# Patient Record
Sex: MALE | URBAN - METROPOLITAN AREA
[De-identification: names, ages, dates, MRNs, and addresses within clinical notes are randomized per-mention and may not be internally consistent; named-entity substitution may affect disease eponyms.]

---

## 2024-02-02 ENCOUNTER — APPOINTMENT (OUTPATIENT)
Dept: URBAN - METROPOLITAN AREA SURGERY 13 | Age: 49
Setting detail: DERMATOLOGY
End: 2024-02-08

## 2024-02-02 DIAGNOSIS — L81.4 OTHER MELANIN HYPERPIGMENTATION: ICD-10-CM

## 2024-02-02 DIAGNOSIS — D22 MELANOCYTIC NEVI: ICD-10-CM

## 2024-02-02 DIAGNOSIS — D485 NEOPLASM OF UNCERTAIN BEHAVIOR OF SKIN: ICD-10-CM

## 2024-02-02 DIAGNOSIS — L82.1 OTHER SEBORRHEIC KERATOSIS: ICD-10-CM

## 2024-02-02 DIAGNOSIS — Z85.828 PERSONAL HISTORY OF OTHER MALIGNANT NEOPLASM OF SKIN: ICD-10-CM

## 2024-02-02 DIAGNOSIS — D18.0 HEMANGIOMA: ICD-10-CM

## 2024-02-02 PROBLEM — D22.5 MELANOCYTIC NEVI OF TRUNK: Status: ACTIVE | Noted: 2024-02-02

## 2024-02-02 PROBLEM — D18.01 HEMANGIOMA OF SKIN AND SUBCUTANEOUS TISSUE: Status: ACTIVE | Noted: 2024-02-02

## 2024-02-02 PROBLEM — D48.5 NEOPLASM OF UNCERTAIN BEHAVIOR OF SKIN: Status: ACTIVE | Noted: 2024-02-02

## 2024-02-02 PROCEDURE — OTHER SUNSCREEN RECOMMENDATIONS: OTHER

## 2024-02-02 PROCEDURE — OTHER MIPS QUALITY: OTHER

## 2024-02-02 PROCEDURE — 99203 OFFICE O/P NEW LOW 30 MIN: CPT | Mod: 25

## 2024-02-02 PROCEDURE — OTHER COUNSELING: OTHER

## 2024-02-02 PROCEDURE — OTHER BIOPSY BY SHAVE METHOD: OTHER

## 2024-02-02 PROCEDURE — OTHER REASSURANCE: OTHER

## 2024-02-02 PROCEDURE — 11102 TANGNTL BX SKIN SINGLE LES: CPT

## 2024-02-02 ASSESSMENT — LOCATION SIMPLE DESCRIPTION DERM
LOCATION SIMPLE: RIGHT UPPER BACK
LOCATION SIMPLE: LEFT UPPER BACK

## 2024-02-02 ASSESSMENT — LOCATION DETAILED DESCRIPTION DERM
LOCATION DETAILED: LEFT SUPERIOR MEDIAL UPPER BACK
LOCATION DETAILED: LEFT MID-UPPER BACK
LOCATION DETAILED: LEFT SUPERIOR LATERAL UPPER BACK
LOCATION DETAILED: RIGHT SUPERIOR UPPER BACK

## 2024-02-02 ASSESSMENT — LOCATION ZONE DERM: LOCATION ZONE: TRUNK

## 2024-02-02 NOTE — PROCEDURE: BIOPSY BY SHAVE METHOD
Detail Level: Detailed
Depth Of Biopsy: dermis
Was A Bandage Applied: Yes
Size Of Lesion In Cm: 0
X Size Of Lesion In Cm: 0.4
Biopsy Type: H and E
Biopsy Method: Dermablade
Anesthesia Type: 1% lidocaine with 1:100,000 epinephrine and a 1:10 solution of 8.4% sodium bicarbonate
Anesthesia Volume In Cc: 0.5
Hemostasis: Aluminum Chloride
Wound Care: Vaseline
Dressing: bandage
Destruction After The Procedure: No
Type Of Destruction Used: Curettage
Curettage Text: The wound bed was treated with curettage after the biopsy was performed.
Cryotherapy Text: The wound bed was treated with cryotherapy after the biopsy was performed.
Electrodesiccation Text: The wound bed was treated with electrodesiccation after the biopsy was performed.
Electrodesiccation And Curettage Text: The wound bed was treated with electrodesiccation and curettage after the biopsy was performed.
Silver Nitrate Text: The wound bed was treated with silver nitrate after the biopsy was performed.
Lab: [id number]
Lab Facility: [id number]
Consent: Verbal consent was obtained and risks were reviewed including but not limited to scarring, infection, bleeding, scabbing, incomplete removal, nerve damage and allergy to anesthesia.
Post-Care Instructions: I reviewed with the patient in detail post-care instructions. Patient is to keep the biopsy site dry overnight, and then apply bacitracin twice daily until healed. Patient may apply hydrogen peroxide soaks to remove any crusting.
Notification Instructions: Patient will be notified of biopsy results. However, patient instructed to call the office if not contacted within 2 weeks.
Billing Type: Third-Party Bill
Information: Selecting Yes will display possible errors in your note based on the variables you have selected. This validation is only offered as a suggestion for you. PLEASE NOTE THAT THE VALIDATION TEXT WILL BE REMOVED WHEN YOU FINALIZE YOUR NOTE. IF YOU WANT TO FAX A PRELIMINARY NOTE YOU WILL NEED TO TOGGLE THIS TO 'NO' IF YOU DO NOT WANT IT IN YOUR FAXED NOTE.

## 2025-02-14 ENCOUNTER — APPOINTMENT (OUTPATIENT)
Dept: URBAN - METROPOLITAN AREA SURGERY 13 | Age: 50
Setting detail: DERMATOLOGY
End: 2025-02-14

## 2025-02-14 DIAGNOSIS — D22 MELANOCYTIC NEVI: ICD-10-CM

## 2025-02-14 DIAGNOSIS — Z85.828 PERSONAL HISTORY OF OTHER MALIGNANT NEOPLASM OF SKIN: ICD-10-CM

## 2025-02-14 DIAGNOSIS — D18.0 HEMANGIOMA: ICD-10-CM

## 2025-02-14 DIAGNOSIS — L82.1 OTHER SEBORRHEIC KERATOSIS: ICD-10-CM

## 2025-02-14 DIAGNOSIS — L81.4 OTHER MELANIN HYPERPIGMENTATION: ICD-10-CM

## 2025-02-14 PROBLEM — D22.5 MELANOCYTIC NEVI OF TRUNK: Status: ACTIVE | Noted: 2025-02-14

## 2025-02-14 PROBLEM — D18.01 HEMANGIOMA OF SKIN AND SUBCUTANEOUS TISSUE: Status: ACTIVE | Noted: 2025-02-14

## 2025-02-14 PROCEDURE — 99213 OFFICE O/P EST LOW 20 MIN: CPT

## 2025-02-14 PROCEDURE — OTHER REASSURANCE: OTHER

## 2025-02-14 PROCEDURE — OTHER MIPS QUALITY: OTHER

## 2025-02-14 PROCEDURE — OTHER COUNSELING: OTHER

## 2025-02-14 PROCEDURE — OTHER SUNSCREEN RECOMMENDATIONS: OTHER

## 2025-02-14 ASSESSMENT — LOCATION DETAILED DESCRIPTION DERM
LOCATION DETAILED: RIGHT SUPERIOR UPPER BACK
LOCATION DETAILED: LEFT SUPERIOR MEDIAL UPPER BACK
LOCATION DETAILED: LEFT MID-UPPER BACK

## 2025-02-14 ASSESSMENT — LOCATION SIMPLE DESCRIPTION DERM
LOCATION SIMPLE: LEFT UPPER BACK
LOCATION SIMPLE: RIGHT UPPER BACK

## 2025-02-14 ASSESSMENT — LOCATION ZONE DERM: LOCATION ZONE: TRUNK

## 2025-05-01 NOTE — PROCEDURE: MIPS QUALITY
Encounter Date: 5/1/2025       History     Chief Complaint   Patient presents with    Abdominal Pain    Weakness     History of hypertension, diabetes, and prostate cancer.  Patient presents following a syncopal episode at home this morning.  After moving the patient over to the stretcher here, he had another syncopal episode.  Maintained a strong carotid pulse with episodic apnea.  Responded within 30 seconds to a minute and was alert at that point.  Upon hooking the patient up to the monitor, we determined his heart rate was in the 30s.  EKG shows a junctional rhythm at 36 beats per minute.  No other ectopy.  Last EKG was approximately 2 years ago with a first-degree AV block at that time.  Also saw a previous EKG with SVT.  Last echo was also 2 years ago with an EF of 55%.      Review of patient's allergies indicates:  No Known Allergies  Past Medical History:   Diagnosis Date    Cancer     prostate diagnosed 2013    Diabetes mellitus     H. pylori infection 6/13/2023    Hypertension      Past Surgical History:   Procedure Laterality Date    RADIOACTIVE SEED IMPLANTATION OF PROSTATE       No family history on file.  Social History[1]  Review of Systems   Unable to perform ROS: Acuity of condition       Physical Exam     Initial Vitals   BP Pulse Resp Temp SpO2   05/01/25 1037 05/01/25 1037 05/01/25 1037 05/01/25 1215 05/01/25 1037   (!) 156/59 82 (!) 23 99.6 °F (37.6 °C) (!) 90 %      MAP       --                Physical Exam    Nursing note and vitals reviewed.  Constitutional: No distress.   HENT:   Head: Normocephalic.   Hematoma right forehead   Eyes: EOM are normal. Pupils are equal, round, and reactive to light.   Neck: Neck supple.   Cardiovascular:  Regular rhythm and normal heart sounds.   Bradycardia present.         Pulmonary/Chest: No respiratory distress. He has rhonchi (Left upper and lower).   Abdominal: Abdomen is soft. There is no abdominal tenderness.   Musculoskeletal:      Cervical back: Neck 
supple.     Neurological: He is alert. GCS score is 15. GCS eye subscore is 4. GCS verbal subscore is 5. GCS motor subscore is 6.   Skin: Skin is warm and dry. Capillary refill takes less than 2 seconds. There is pallor.         Medical Screening Exam   See Full Note    ED Course   Procedures  Labs Reviewed   COMPREHENSIVE METABOLIC PANEL - Abnormal       Result Value    Sodium 136      Potassium 7.2 (*)     Chloride 109 (*)     CO2 13 (*)     Anion Gap 21 (*)     Glucose 166 (*)     BUN 80 (*)     Creatinine 5.16 (*)     BUN/Creatinine Ratio 16      Calcium 9.3      Total Protein 7.9 (*)     Albumin 3.9      Globulin 4.0      A/G Ratio 1.0      Bilirubin, Total 0.6      Alk Phos 113      ALT 29      AST 72 (*)     eGFR 10 (*)    LACTIC ACID, PLASMA - Abnormal    Lactic Acid 3.9 (*)    MAGNESIUM - Abnormal    Magnesium 3.3 (*)    TROPONIN I - Abnormal    Troponin I High Sensitivity 65.6 (*)    URINALYSIS, REFLEX TO URINE CULTURE - Abnormal    Color, UA Yellow      Clarity, UA Clear      pH, UA 5.0      Leukocytes, UA Negative      Nitrites, UA Negative      Protein, UA >=300 (*)     Glucose,  (*)     Ketones, UA Negative      Urobilinogen, UA 0.2      Bilirubin, UA Negative      Blood, UA Large (*)     Specific Gravity, UA 1.025     CBC WITH DIFFERENTIAL - Abnormal    WBC 22.86 (*)     RBC 4.10 (*)     Hemoglobin 11.9 (*)     Hematocrit 37.4 (*)     MCV 91.2      MCH 29.0      MCHC 31.8 (*)     RDW 14.0      Platelet Count 349      MPV 9.7      Neutrophils % 82.8 (*)     Lymphocytes % 10.5 (*)     Neutrophils, Abs 18.92 (*)     Lymphocytes, Absolute 2.39      Diff Type Manual      Monocytes % 6.6 (*)     Eosinophils % 0.0 (*)     Basophils % 0.1      Monocytes, Absolute 1.52 (*)     Eosinophils, Absolute 0.00      Basophils, Absolute 0.03     URINALYSIS, MICROSCOPIC - Abnormal    WBC, UA 0-5      RBC, UA 15-25 (*)     Bacteria, UA Few (*)     Squamous Epithelial Cells, UA Few (*)    MANUAL DIFFERENTIAL - 
Abnormal    Segmented Neutrophils, Man % 79 (*)     Lymphocytes, Man % 19 (*)     Monocytes, Man % 2      nRBC, Manual        Platelet Morphology Normal      RBC Morphology Normal     PHOSPHORUS - Abnormal    Phosphorus 6.8 (*)    POCT GLUCOSE MONITORING CONTINUOUS - Abnormal    POC Glucose 151 (*)    POCT GLUCOSE MONITORING CONTINUOUS - Abnormal    POC Glucose 153 (*)    CULTURE, BLOOD   CULTURE, BLOOD   CBC W/ AUTO DIFFERENTIAL    Narrative:     The following orders were created for panel order CBC auto differential.  Procedure                               Abnormality         Status                     ---------                               -----------         ------                     CBC with Differential[[id number]]       Abnormal            Final result               Manual Differential[[id number]]         Abnormal            Final result                 Please view results for these tests on the individual orders.   LACTIC ACID, PLASMA   POCT GLUCOSE MONITORING CONTINUOUS   POCT GLUCOSE MONITORING CONTINUOUS          Imaging Results              CT Head Without Contrast (Final result)  Result time 05/01/25 11:35:54      Final result by Presley López MD (05/01/25 11:35:54)                   Impression:      No acute intracranial findings.      Electronically signed by: Presley López  Date:    05/01/2025  Time:    11:35               Narrative:    EXAMINATION:  CT HEAD WITHOUT CONTRAST    CLINICAL HISTORY:  head injury, right scalp hematoma;    TECHNIQUE:  Low dose axial images were obtained through the head.  Coronal and sagittal reformations were also performed. Contrast was not administered.    COMPARISON:  Head CT performed 06/07/2023.    FINDINGS:  Blood: No acute intracranial hemorrhage.    Parenchyma: No definite loss of gray-white differentiation to suggest acute or subacute transcortical infarct. Parenchymal volume loss.  Areas of nonspecific white matter hypoattenuation may reflect sequela 
Quality 226: Preventive Care And Screening: Tobacco Use: Screening And Cessation Intervention: Patient screened for tobacco use and is an ex/non-smoker
Quality 431: Preventive Care And Screening: Unhealthy Alcohol Use - Screening: Patient not identified as an unhealthy alcohol user when screened for unhealthy alcohol use using a systematic screening method
of chronic small vessel ischemic disease.    Ventricles/Extra-axial spaces: No abnormal extra-axial fluid collection. Basal cisterns are patent.    Vessels: Mild atherosclerotic calcification.    Orbits: Unremarkable.    Scalp: Small right frontal scalp soft tissue contusion.    Skull: There are no depressed skull fractures or destructive bone lesions.    Sinuses and mastoids: Scattered paranasal sinus mucosal thickening.  Small volume frothy debris within the paranasal sinuses and nasal cavity may reflect acute mucosal inflammation, in the appropriate clinical context.    Other findings: None                                       X-Ray Chest AP Portable (Final result)  Result time 05/01/25 11:59:01      Final result by Presley López MD (05/01/25 11:59:01)                   Impression:      No convincing evidence of acute cardiopulmonary disease.      Electronically signed by: Presley López  Date:    05/01/2025  Time:    11:59               Narrative:    EXAMINATION:  XR CHEST AP PORTABLE    CLINICAL HISTORY:  syncope;    TECHNIQUE:  Single frontal view of the chest was performed.    COMPARISON:  None    FINDINGS:  Monitoring leads overlie the chest.  Enlarged cardiac silhouette.    Lungs grossly clear.  No definite pneumothorax or large volume pleural effusion.    No acute findings in the visualized abdomen.  Osseous and soft tissue structures appear without definite acute change.                                       Medications   EPINEPHrine (PF) (ADRENALIN) 5 mg in D5W 250 mL infusion (0.02 mcg/kg/min × 98 kg Intravenous New Bag 5/1/25 1113)   LORazepam injection 0.5 mg (has no administration in time range)   sodium chloride 0.9% bolus 500 mL 500 mL (0 mLs Intravenous Stopped 5/1/25 1140)   atropine injection 1 mg (1 mg Intravenous Given 5/1/25 1055)   atropine injection 0.5 mg (0.5 mg Intravenous Given 5/1/25 1050)   atropine injection 0.5 mg (0.5 mg Intravenous Given 5/1/25 1042)   atropine injection 1 
Detail Level: Detailed
mg (1 mg Intravenous Given 5/1/25 1101)   calcium gluconate 100 mg/mL (10%) injection 1 g (1 g Intravenous Given 5/1/25 1144)   dextrose 50% injection 25 g (25 g Intravenous Given 5/1/25 1148)   insulin regular injection 10 Units 0.1 mL (10 Units Intravenous Given 5/1/25 1152)   sodium zirconium cyclosilicate packet 10 g (10 g Oral Given 5/1/25 1200)   cefTRIAXone injection 1 g (1 g Intravenous Given 5/1/25 1139)   sodium bicarbonate 8.4 % (1 mEq/mL) injection 50 mEq (50 mEq Intravenous Given 5/1/25 1150)     Medical Decision Making  Patient maxed out on atropine at 3 mg, responded appropriately with each dose.  Placed on epinephrine infusion with good response at lowest dose.      Source of symptomatic bradycardia determined to be acute renal failure with hyperkalemia.  Given Augustin's cocktail.  Will give Lokelma if available. [facility] consult with Dr. Gustafson.  Agrees with the plan.  Plan to transfer to [facility] rush to ICU.  This patient was discussed initially with Dr. Montana, but will be admitted to the intensivist.    Amount and/or Complexity of Data Reviewed  Labs: ordered.  Radiology: ordered.    Risk  OTC drugs.  Prescription drug management.               ED Course as of 05/01/25 1307   Thu May 01, 2025   1143 Meds verified with wife.  Is on a beta-blocker, atenolol 50 mg daily.  This has been unchanged over the past few years.  She also reports patient is a DNR/DNI [GM]   1235 Discussed with/ accepted by Dr Ruddy Montana. [GM]      ED Course User Index  [GM] Tunde Yoder FNP                           Clinical Impression:   Final diagnoses:  [N17.9] Acute renal failure, unspecified acute renal failure type  [E87.5] Hyperkalemia  [E83.41] Hypermagnesemia  [E87.20] Lactic acidosis  [R00.1] Symptomatic bradycardia (Primary)        ED Disposition Condition    Transfer to Another Facility Critical                  Tunde Yoder FNP  05/01/25 1243         [1]   Social History  Tobacco Use    Smoking status: 
Former     Types: Cigarettes    Smokeless tobacco: Never   Substance Use Topics    Alcohol use: Never    Drug use: Never        Tunde Yoder, [facility]  05/01/25 [id number]